# Patient Record
Sex: FEMALE | Race: OTHER | HISPANIC OR LATINO | URBAN - METROPOLITAN AREA
[De-identification: names, ages, dates, MRNs, and addresses within clinical notes are randomized per-mention and may not be internally consistent; named-entity substitution may affect disease eponyms.]

---

## 2023-11-17 PROBLEM — Z76.89 TRANSFER REQUESTED FOR CONTINUITY OF CARE: Status: ACTIVE | Noted: 2023-11-17

## 2023-11-17 PROBLEM — O26.899 ABDOMINAL PAIN AFFECTING PREGNANCY: Status: ACTIVE | Noted: 2023-11-17

## 2023-11-17 PROBLEM — R10.9 ABDOMINAL PAIN AFFECTING PREGNANCY: Status: ACTIVE | Noted: 2023-11-17

## 2023-12-06 ENCOUNTER — SOCIAL WORK (OUTPATIENT)
Dept: ADMINISTRATIVE | Facility: OTHER | Age: 18
End: 2023-12-06

## 2023-12-06 NOTE — PROGRESS NOTES
PRADEEP met with pt regarding initial OB assessment.Pt does not speak English and had a  present. PRADEEP informed this is pt's 2nd pregnancy/1-miscarriage. PRADEEP informed pt lives with her uncle and able to perform ADL's independently. PRADEEP informed pt does not work. SW informed pt's support system is her uncle/Radu. PRADEEP informed pt has medicaid([email or web address] "[employer]"). PRADEEP informed pt does have WIC. PRADEEP informed pt is going to breastfeed.  No other needs identified at this time.    LIAM Nuñez  Desk:[phone]  Fax:[phone]

## 2023-12-22 PROBLEM — O36.8130 DECREASED FETAL MOVEMENTS IN THIRD TRIMESTER: Status: ACTIVE | Noted: 2023-12-22

## 2024-03-25 PROBLEM — O36.8130 DECREASED FETAL MOVEMENTS IN THIRD TRIMESTER: Status: RESOLVED | Noted: 2023-12-22 | Resolved: 2024-03-25
